# Patient Record
Sex: MALE | Race: BLACK OR AFRICAN AMERICAN | NOT HISPANIC OR LATINO | Employment: UNEMPLOYED | URBAN - METROPOLITAN AREA
[De-identification: names, ages, dates, MRNs, and addresses within clinical notes are randomized per-mention and may not be internally consistent; named-entity substitution may affect disease eponyms.]

---

## 2023-11-28 ENCOUNTER — OFFICE VISIT (OUTPATIENT)
Dept: BEHAVIORAL HEALTH | Facility: CLINIC | Age: 23
End: 2023-11-28
Payer: COMMERCIAL

## 2023-11-28 VITALS — DIASTOLIC BLOOD PRESSURE: 80 MMHG | SYSTOLIC BLOOD PRESSURE: 127 MMHG | HEART RATE: 78 BPM

## 2023-11-28 DIAGNOSIS — F11.90 OPIOID USE DISORDER: Primary | ICD-10-CM

## 2023-11-28 DIAGNOSIS — F20.9 SCHIZOPHRENIA, UNSPECIFIED TYPE (H): ICD-10-CM

## 2023-11-28 DIAGNOSIS — R69 DIAGNOSIS DEFERRED: Primary | ICD-10-CM

## 2023-11-28 LAB
AMPHETAMINE QUAL URINE POCT: NEGATIVE
BARBITURATE QUAL URINE POCT: NEGATIVE
BENZODIAZEPINE QUAL URINE POCT: NEGATIVE
BUPRENORPHINE QUAL URINE POCT: ABNORMAL
COCAINE QUAL URINE POCT: NEGATIVE
CREATININE QUAL URINE POCT: ABNORMAL
FENTANYL UR QL: ABNORMAL
INTERNAL QC QUAL URINE POCT: ABNORMAL
MDMA QUAL URINE POCT: NEGATIVE
METHADONE QUAL URINE POCT: NEGATIVE
METHAMPHETAMINE QUAL URINE POCT: NEGATIVE
OPIATE QUAL URINE POCT: NEGATIVE
OXYCODONE QUAL URINE POCT: NEGATIVE
PH QUAL URINE POCT: ABNORMAL
PHENCYCLIDINE QUAL URINE POCT: NEGATIVE
POCT KIT EXPIRATION DATE: ABNORMAL
POCT KIT LOT NUMBER: ABNORMAL
SPECIFIC GRAVITY POCT: 1
TEMPERATURE URINE POCT: ABNORMAL
THC QUAL URINE POCT: NEGATIVE

## 2023-11-28 PROCEDURE — 99204 OFFICE O/P NEW MOD 45 MIN: CPT | Mod: GC | Performed by: FAMILY MEDICINE

## 2023-11-28 PROCEDURE — G0480 DRUG TEST DEF 1-7 CLASSES: HCPCS | Mod: 90 | Performed by: FAMILY MEDICINE

## 2023-11-28 PROCEDURE — 80307 DRUG TEST PRSMV CHEM ANLYZR: CPT | Performed by: FAMILY MEDICINE

## 2023-11-28 PROCEDURE — 99000 SPECIMEN HANDLING OFFICE-LAB: CPT | Performed by: FAMILY MEDICINE

## 2023-11-28 PROCEDURE — H0038 SELF-HELP/PEER SVC PER 15MIN: HCPCS

## 2023-11-28 RX ORDER — OLANZAPINE 10 MG/1
10 TABLET ORAL AT BEDTIME
Qty: 30 TABLET | Refills: 0 | Status: SHIPPED | OUTPATIENT
Start: 2023-11-28 | End: 2023-12-28

## 2023-11-28 RX ORDER — BUPRENORPHINE AND NALOXONE 8; 2 MG/1; MG/1
1 FILM, SOLUBLE BUCCAL; SUBLINGUAL 2 TIMES DAILY
Qty: 14 FILM | Refills: 0 | Status: SHIPPED | OUTPATIENT
Start: 2023-11-28 | End: 2023-12-05

## 2023-11-28 ASSESSMENT — PATIENT HEALTH QUESTIONNAIRE - PHQ9: SUM OF ALL RESPONSES TO PHQ QUESTIONS 1-9: 15

## 2023-11-28 NOTE — PROGRESS NOTES
[facility] Initial Visit    ASSESSMENT/PLAN                                                      Opioid use disorder, severe, dependence  Has not engaged in treatment previously. He reports he took Suboxone without precipitated withdrawal prior to clinic visit today and his COWS score was low along with UDS positive for buprenorphine which corroborates this history. Given comorbid psychotic disorder and limited social support, we discussed possibility of obtaining treatment at a detox facility to initiate Suboxone. His preference seems to be to self-initiate with close follow-up at this clinic which may be reasonable given he has apparently tolerated prior doses of Suboxone.   - Drugs of Abuse Screen Urine (POC CUPS) POCT  - Fentanyl Qualitative with Reflex to Quant Urine  - Drugs of Abuse Screen Urine (POC CUPS) POCT  - Fentanyl Qualitative with Reflex to Quant Urine  - buprenorphine HCl-naloxone HCl (SUBOXONE) 8-2 MG per film  Dispense: 14 Film; Refill: 0  - naloxone (NARCAN) 4 MG/0.1ML nasal spray  Dispense: 0.2 mL; Refill: 11    Schizophrenia, unspecified type (H)  No current obvious psychotic symptoms but patient reporting insomnia. It is reasonable to provide a short prescription of prior olanzapine given prior documentation that he has been prescribed this somewhat regularly and this should help engage him in substance use treatment.   - OLANZapine (ZYPREXA) 10 MG tablet  Dispense: 30 tablet; Refill: 0     Return in 3 days (on 12/1/2023) for Dr Rodriguez.    Patient counseling completed today:  Discussed mechanism of action, potential risks/benefits/side effects of medications and other recommendations above.    Discussed risk of precipitated withdrawal with initiation of buprenorphine in the presence of full opioid agonists.    Reviewed directions for initiation of buprenorphine to reduce risk of precipitated withdrawal and maximize efficacy.    Harm reduction counseling including  never use alone, availability of naloxone, avoiding combination of opioids with benzodiazepines, alcohol, or other sedatives, safer administration.      Discussed importance of avoiding isolation, building a network of supportive relationships, avoiding people/places/things associated with past use to reduce risk of relapse; including motivational interviewing regarding psychosocial treatment for addiction.     SUBJECTIVE                                                      CC/HPI:  Sami Paulino is a 23 year old male with PMH schizophrenia, and opioid use disorder who presents to the Recovery Clinic for initial visit.      Brief History:  Sami Paulino was first seen in Recovery Clinic on 11/28/23. They were referred by a treatment facility. States he is going to treatment in [facility] but did not know where.   Patient's reasons for seeking treatment include ready to stop using.     Substance Use History :  Opioids:   Age at first use: 19  Current use: substance: Fentanyl and meth; quantity daily; route: inhalation ; timing of last use: yesterday Fentanyl and meth;      IV drug use: No   History of overdose: No  Previous residential or outpatient treatments for addiction : No  Previous medication treatments for addiction: Yes: suboxone   Longest period of sobriety: none  Medical complications related to substance use: none  Hepatitis C: not tested   HIV: not tested ;     Taking buprenorphine? No   Narcan currently available: No    DSM-5 OUD criteria met:  Taken in larger amounts/greater time spent in behavior over longer period of time than intended,Yes   Persistent desire or unsuccessful efforts to cut down or control use/behavior, No   A great deal of time is spent in activities necessary to obtain the substance/participate in the behavior or recover from its effects, Yes   Cravings, Yes  Recurrent use/behavior resulting in failure to fulfill major role obligations at work, school, or home, Yes  Continued  use/behavior despite having persistent or recurrent social or interpersonal problems caused or exacerbated by effects of use/behavior, Yes   Important social, occupational, or recreational activities are given up or reduced because of use/behavior, Yes   Recurrent use/behavior in situations in which it is physically hazardous, No   Continued use/behavior despite knowledge of having a persistent or recurrent physical or psychological problem that is likely to have been caused or exacerbated by use/behavior, Yes   Tolerance, Yes  Withdrawal, Yes    Other Addiction History:  Stimulants (cocaine, methamphetamine, MDMA/ecstasy)   yes  Sedatives/hypnotics/anxiolytics: (benzodiazepines, GHB, Ambien, phenobarbital)  yes  Alcohol:   Yes   Nicotine: (cigarettes, vaping, chew/snuff)  Yes   Cannabis:   Yes   Hallucinogens/Dissociatives: (acid, mushrooms, ketamine)  None   Eating disorder:  none  Gambling:   None        Today he says he is hoping to be prescribed Suboxone and olanzapine, which he was told at the [facility] that he could receive that treatment here. He says he is trying to do their '24 hour program' but states he is temporarily staying with friends right now. He said he was given an Rx for Suboxone for 5 days and it was stolen or he lost it 'trying to get high.' He says this was in the past week from [facility] (no records available in chart review). He says 'I might have taken some' (Suboxone) trying to avoid withdrawal. Later he says that he has taken some since his last fentanyl use ~2 days ago.  He is feeling withdrawal 'just a little bit' right now 'but not really.' He says 'I think I have diabetes and I haven't really slept in the last 4-5 days.' He is hoping to get a refill of olanzapine which he hasn't taken for a month and he thinks that is related to his lack of sleep. He is not clear about why he hasn't kept up with his prior psychiatric provider but states it is related to ongoing substance  use.  He has never been treated with Suboxone for a prolonged period 'I took it twice recently.' He wants to only take for a couple of days because 'I don't want to get hooked on that either.'   He doesn't have stable housing and doesn't want to be out in the cold. He wants to be sober to be able to maintain housing.     [place] Prescription Drug Monitoring Program Reviewed:  Yes; no data available      PAST PSYCHIATRIC HISTORY:  Diagnoses- schizophrenia  Suicide Attempts: No   Hospitalizations: Yes         11/28/2023     3:00 PM   PHQ   PHQ-9 Total Score 15   Q9: Thoughts of better off dead/self-harm past 2 weeks Not at all         If PHQ-9 score of 15 or higher, has Recovery Clinic therapist or provider been notified? Yes    Any current suicidal ideation? No  If yes, has [facility] therapist or provider been notified? N/A    Mental health provider: not currently. Has not been following up with his psychiatrist  (follow up on MH referral if needed)      Social History  Housing status: with a couple of friends   Employment status: Unemployed, seeking work  Relationship status: Single  Children: no children  Legal: none  Insurance needs: yes  Contact information up to date? Yes     3rd Party Involvement none  (please obtain MICHAEL if pt would like to include)        Medications:  DIPHENHYDRAMINE HCL PO,   fluconazole (DIFLUCAN) 200 MG tablet, Take 200 mg by mouth daily  mineral oil-hydrophilic petrolatum (AQUAPHOR) ointment, Apply topically as needed  triamcinolone (KENALOG) 0.1 % cream, Apply a thin layer to itchy areas of skin in the morning (avoid use on face, underarms, or groin).  triamcinolone (KENALOG) 0.1 % cream, Apply sparingly to affected area three times daily as needed  triamcinolone (KENALOG) 0.1 % ointment, Apply a thin layer to itchy areas of skin at bedtime (avoid use on the face, underarms, or groin).  triamcinolone acetonide 0.05 % OINT, Externally apply topically 2 times daily    No current  facility-administered medications on file prior to visit.      No Known Allergies    No past medical history on file.    No past surgical history on file.    Family History   Problem Relation Age of Onset    Hypertension Mother     Hypertension Father     Hypertension Maternal Grandmother     Hypertension Maternal Grandfather     Diabetes Other     Breast Cancer No family hx of     Colon Cancer No family hx of     Prostate Cancer No family hx of     Other Cancer No family hx of     Skin Cancer No family hx of          REVIEW OF SYSTEMS:  General: Withdrawal symptoms as described below.  No recent fevers.   Skin: has scattered excoriated skin lesions that do not appear infected    OBJECTIVE                                                        Clinical Opioid Withdrawal Scale (COWS)    Resting Pulse Rate  0  =  <=80    Sweating    (over past 1/2 hour) 0  =  no report of chills or flushing   Restlessness  0  =  able to sit still   Pupil size  0  =  pupils pinned or normal size for room light   Bone or Joint Aches    (acute only) 1  =  mild diffuse discomfort   Runny nose or tearing    (unrelated to cold/allergies) 0  =  not present   GI Upset    (over past 1/2 hour) 2  =  nausea or loose stool   Tremor    (outstretched hands) 0  =  no tremor   Yawning    (during assessment) 0  =  no yawning   Anxiety/Irritability 1  =  patient reports increasing irritability or anxiousness   Gooseflesh skin 0  =  skin is smooth     TOTAL SCORE  Add column for score   4       /80   Pulse 78     Physical Exam  Constitutional:       General: He is not in acute distress.     Appearance: He is not ill-appearing or diaphoretic.   HENT:      Nose: No rhinorrhea.   Eyes:      General: No scleral icterus.     Conjunctiva/sclera: Conjunctivae normal.   Skin:     Findings: Lesion present.      Comments: Scattered excoriated/scabbed lesions on arms, legs, and head without purulent discharge   Neurological:      General: No focal deficit  present.      Mental Status: He is alert.   Psychiatric:         Behavior: Behavior normal.      Comments: Denies SI, AVH, no frankly bizarre statements         Labs:    UDS:   Lab Results   Component Value Date    BUP Screen Positive (A) 11/28/2023    BZO Negative 11/28/2023    BAR Negative 11/28/2023    ASTER Negative 11/28/2023    MAMP Negative 11/28/2023    AMP Negative 11/28/2023    MDMA Negative 11/28/2023    MTD Negative 11/28/2023    ERD652 Negative 11/28/2023    OXY Negative 11/28/2023    PCP Negative 11/28/2023    THC Negative 11/28/2023    TEMP 90 F 11/28/2023    SGPOCT 1.005 11/28/2023       *POC urine drug screen does not screen for Fentanyl    Recent Results (from the past 720 hour(s))   Drugs of Abuse Screen Urine (POC CUPS) POCT    Collection Time: 11/28/23  3:35 PM   Result Value Ref Range    POCT Kit Lot Number [id number]     POCT Kit Expiration Date 6/26/25     Temperature Urine POCT 90 F 90 F, 92 F, 94 F, 96 F, 98 F, 100 F    Specific [place] POCT 1.005 1.005, 1.015, 1.025    pH Qual Urine POCT 7 pH 4 pH, 5 pH, 7 pH, 9 pH    Creatinine Qual Urine POCT 50 mg/dL 20 mg/dL, 50 mg/dL, 100 mg/dL, 200 mg/dL    Internal QC Qual Urine POCT Valid Valid    Amphetamine Qual Urine POCT Negative Negative    Barbiturate Qual Urine POCT Negative Negative    Buprenorphine Qual Urine POCT Screen Positive (A) Negative    Benzodiazepine Qual Urine POCT Negative Negative    Cocaine Qual Urine POCT Negative Negative    Methamphetamine Qual Urine POCT Negative Negative    MDMA Qual Urine POCT Negative Negative    Methadone Qual Urine POCT Negative Negative    Opiate Qual Urine POCT Negative Negative    Oxycodone Qual Urine POCT Negative Negative    Phencyclidine Qual Urine POCT Negative Negative    THC Qual Urine POCT Negative Negative   Fentanyl Qualitative with Reflex to Quant Urine    Collection Time: 11/28/23  4:21 PM   Result Value Ref Range    Fentanyl Qual Urine Screen Positive (A) Screen Negative       Sunil  MD Lam   Addiction med fellow  [facility]  [address], [address] [place] [id number]  [phone]    I saw the patient with the fellow and participated in key portions of the service including the mental status examination and developing the plan of care. I reviewed key portions of the history with the fellow. I agree with the findings and plan as documented in this note.     Aura Rodriguez MD  Addiction Medicine  [facility]  [address], [place] [address] [id number] [phone]

## 2023-11-28 NOTE — PROGRESS NOTES
[facility]    Peer  met with Sami Paulino in the Recovery Clinic to introduce herself, detail services provided and discuss current status of recovery. Pt appeared alert, oriented and open to feedback during our discussion.     Pt arrives for visit with provider for suboxone.  JONATHAN sees patient today under provider diagnosis of opioid substance disorder, severe, dependence  (H)   Per Providers request, worked to find a safe location to start on Suboxone.  We secured a bed at [facility].     Pt ultimately decided that he would be safe to go home because he had already started Suboxone.        JONATHAN provided business card to pt welcoming contact for recovery based support and resources. PRC and pt agree to speak again during an upcoming  visit.           Service Type:     Individual     Session Start Time:  3:30                     Session End Time:    3:45    Session Length:             Patient Goal:   To utilize suboxone assisted treatment for sobriety and long term recovery.     Goal Progress:   Ongoing.    Key Risk Factors to Recovery:   JONATHAN encourages being aware of risk factors that can lead to re-use which include avoiding isolation, avoiding triggers and managing cravings in a healthy manner. being open and willing to acceptance and change on a daily basis.  PRC encourages pt to establish a sober network calling tree to reach out to when needed.  Continue to practice honesty with ourselves and trusted support person(s).   [facility] encourages regular attendance at recovery based meetings as well as finding a sponsor for mentoring and accountability.   PRC encourages consideration of other services such as counseling for mental health issues which can correlate with our substance use.      Support Needs:   Ongoing care, support and resources for opioid substance use disorder.     Follow up:   JONATHAN has provided pt with her contact information for support and resource needs.     PRC and pt agree to meet during an upcoming  visit.       [facility]  [address], [address]   [place], [place], [address]  Clinic Phone: [phone]  Clinic Fax: [phone]  Peer  phone: [phone]    Open Monday - Friday  9:00am-4:00pm  Walk in hours: 9am-3pm      Jade Marquez  November 28, 2023  5:03 PM    MAGDALENA Salcedo provides clinical oversite and supervision of care.

## 2023-12-01 LAB
FENTANYL UR-MCNC: 22 NG/ML
NORFENTANYL UR-MCNC: 64.3 NG/ML